# Patient Record
(demographics unavailable — no encounter records)

---

## 2025-05-16 NOTE — PHYSICAL EXAM

## 2025-05-21 NOTE — RISK ASSESSMENT
[PHQ-9 Negative - No further assessment needed] : PHQ-9 Negative - No further assessment needed [Have you ever fainted, passed out or had an unexplained seizure suddenly and without warning, especially during exercise or in response] : Have you ever fainted, passed out or had an unexplained seizure suddenly and without warning, especially during exercise or in response to sudden loud noises such as doorbells, alarm clocks and ringing telephones? No [Have you ever had exercise-related chest pain or shortness of breath?] : Have you ever had exercise-related chest pain or shortness of breath? No [Has anyone in your immediate family (parents, grandparents, siblings) or other more distant relatives (aunts, uncles, cousins)  of heart] : Has anyone in your immediate family (parents, grandparents, siblings) or other more distant relatives (aunts, uncles, cousins)  of heart problems or had an unexpected sudden death before age 50 (This would include unexpected drownings, unexplained car accidents in which the relative was driving or sudden infant death syndrome.)? No [Are you related to anyone with hypertrophic cardiomyopathy or hypertrophic obstructive cardiomyopathy, Marfan syndrome, arrhythmogenic] : Are you related to anyone with hypertrophic cardiomyopathy or hypertrophic obstructive cardiomyopathy, Marfan syndrome, arrhythmogenic right ventricular cardiomyopathy, long QT syndrome, short QT syndrome, Brugada syndrome or catecholaminergic polymorphic ventricular tachycardia, or anyone younger than 50 years with a pacemaker or implantable defibrillator? No

## 2025-05-21 NOTE — HISTORY OF PRESENT ILLNESS
[Mother] : mother [Yes] : Patient goes to dentist yearly [Toothpaste] : Primary Fluoride Source: Toothpaste [Uses electronic nicotine delivery system] : uses electronic nicotine delivery system [Exposure to electronic nicotine delivery system] : exposure to electronic nicotine delivery system [Uses drugs] : uses drugs  [Exposure to drugs] : exposure to drugs [Drinks alcohol] : drinks alcohol [Exposure to alcohol] : exposure to alcohol [No] : Patient has not had sexual intercourse [Uses tobacco] : does not use tobacco [Exposure to tobacco] : no exposure to tobacco [Has problems with sleep] : does not have problems with sleep [Gets depressed, anxious, or irritable/has mood swings] : does not get depressed, anxious, or irritable/has mood swings [Has thought about hurting self or considered suicide] : has not thought about hurting self or considered suicide [FreeTextEntry7] : 13 yo for well exam, in [place] drank so much alcohol that mom found him unconscious, alcohol level was very high and in a few hours came to, saw  cardio, and neuro at the ER, had ekg and echo and all normal, brain mri and labs, blood test were normal except for an elevated alcohol level,  [de-identified] : went to a [employer] program once before this and once after this, sees a therapist and group therapy and then went to a therapeutic boarding school, just came back last weekend from the [employer] program, jsut came back and will be doing school in the morning and iiop in the afternoon, Meds: Intuniv 4mg (tried methylphenidate), abilify 5mg a day, psychiatrist Sheri Rome, and therapist in [place],

## 2025-05-21 NOTE — HISTORY OF PRESENT ILLNESS
[Mother] : mother [Yes] : Patient goes to dentist yearly [Toothpaste] : Primary Fluoride Source: Toothpaste [Uses electronic nicotine delivery system] : uses electronic nicotine delivery system [Exposure to electronic nicotine delivery system] : exposure to electronic nicotine delivery system [Uses drugs] : uses drugs  [Exposure to drugs] : exposure to drugs [Drinks alcohol] : drinks alcohol [Exposure to alcohol] : exposure to alcohol [No] : Patient has not had sexual intercourse [Uses tobacco] : does not use tobacco [Exposure to tobacco] : no exposure to tobacco [Has problems with sleep] : does not have problems with sleep [Gets depressed, anxious, or irritable/has mood swings] : does not get depressed, anxious, or irritable/has mood swings [Has thought about hurting self or considered suicide] : has not thought about hurting self or considered suicide [FreeTextEntry7] : 13 yo for well exam, in [place] drank so much alcohol that mom found him unconscious, alcohol level was very high and in a few hours came to, saw  cardio, and neuro at the ER, had ekg and echo and all normal, brain mri and labs, blood test were normal except for an elevated alcohol level,  [de-identified] : went to a [employer] program once before this and once after this, sees a therapist and group therapy and then went to a therapeutic boarding school, just came back last weekend from the [employer] program, jsut came back and will be doing school in the morning and iiop in the afternoon, Meds: Intuniv 4mg (tried methylphenidate), abilify 5mg a day, psychiatrist Sheri Rome, and therapist in [place],